# Patient Record
Sex: MALE | Race: WHITE | NOT HISPANIC OR LATINO | Employment: OTHER | URBAN - METROPOLITAN AREA
[De-identification: names, ages, dates, MRNs, and addresses within clinical notes are randomized per-mention and may not be internally consistent; named-entity substitution may affect disease eponyms.]

---

## 2018-12-20 DIAGNOSIS — Z79.899 DRUG THERAPY: Primary | ICD-10-CM

## 2018-12-20 DIAGNOSIS — F41.1 GENERALIZED ANXIETY DISORDER: ICD-10-CM

## 2018-12-20 LAB
AMPHETAMINES UR QL: NOT DETECTED NG/ML
BARBITURATES UR QL SCN: NOT DETECTED NG/ML
BENZODIAZ UR QL SCN: ABNORMAL NG/ML
BUPRENORPHINE UR QL: NOT DETECTED NG/ML
CANNABINOIDS UR QL: NOT DETECTED NG/ML
COCAINE UR QL SCN: NOT DETECTED NG/ML
D-METHAMPHET UR QL: NOT DETECTED NG/ML
ETHANOL UR QL SCN: NEGATIVE
METHADONE UR QL SCN: NOT DETECTED NG/ML
OPIATES UR QL SCN: NOT DETECTED NG/ML
OXYCODONE UR QL SCN: NOT DETECTED NG/ML
PCP UR QL SCN: NOT DETECTED NG/ML
PROPOXYPH UR QL: NOT DETECTED NG/ML
TRICYCLICS UR QL SCN: ABNORMAL NG/ML

## 2018-12-20 PROCEDURE — 80306 DRUG TEST PRSMV INSTRMNT: CPT

## 2018-12-20 PROCEDURE — 80320 DRUG SCREEN QUANTALCOHOLS: CPT

## 2019-07-26 ENCOUNTER — TRANSFERRED RECORDS (OUTPATIENT)
Dept: HEALTH INFORMATION MANAGEMENT | Facility: CLINIC | Age: 34
End: 2019-07-26

## 2019-07-26 ENCOUNTER — MEDICAL CORRESPONDENCE (OUTPATIENT)
Dept: HEALTH INFORMATION MANAGEMENT | Facility: CLINIC | Age: 34
End: 2019-07-26

## 2019-08-28 ENCOUNTER — PRE VISIT (OUTPATIENT)
Dept: ORTHOPEDICS | Facility: CLINIC | Age: 34
End: 2019-08-28

## 2019-08-28 DIAGNOSIS — M25.551 BILATERAL HIP PAIN: Primary | ICD-10-CM

## 2019-08-28 DIAGNOSIS — M25.552 BILATERAL HIP PAIN: Primary | ICD-10-CM

## 2019-08-28 NOTE — TELEPHONE ENCOUNTER
PREVISIT INFORMATION                                                    Jarret Means scheduled for future visit at [facility] specialty clinics.    Past surgery? Need images. Called [facility] and patient has not had them done.  Left message for patient to call me      Patient is scheduled to see Dr. Roy on 9/3  Reason for visit: bilateral hip pain  Referring provider Jeffy Hastings  Has patient seen previous specialist? No  Medical Records:  Available in chart.  Patient was previously seen at a [facility] [place] or [facility] facility.    REVIEW                                                      New patient packet mailed to patient: Yes  Medication reconciliation complete: No      Current Outpatient Medications   Medication Sig Dispense Refill     BUSPIRONE HCL PO        LORazepam (ATIVAN PO)          Allergies: Patient has no known allergies.        PLAN/FOLLOW-UP NEEDED                                                      Will route to care team for follow-up.    Patient Reminders Given:  Please, make sure you bring an updated list of your medications.   If you are having a procedure, please, present 15 minutes early.  If you need to cancel or reschedule,please call [phone].    Monika De Jesus RN

## 2019-09-03 ENCOUNTER — OFFICE VISIT (OUTPATIENT)
Dept: ORTHOPEDICS | Facility: CLINIC | Age: 34
End: 2019-09-03
Payer: COMMERCIAL

## 2019-09-03 ENCOUNTER — ANCILLARY PROCEDURE (OUTPATIENT)
Dept: GENERAL RADIOLOGY | Facility: CLINIC | Age: 34
End: 2019-09-03
Attending: ORTHOPAEDIC SURGERY
Payer: COMMERCIAL

## 2019-09-03 VITALS
HEART RATE: 112 BPM | WEIGHT: 169.6 LBS | HEIGHT: 71 IN | BODY MASS INDEX: 23.74 KG/M2 | DIASTOLIC BLOOD PRESSURE: 79 MMHG | OXYGEN SATURATION: 99 % | SYSTOLIC BLOOD PRESSURE: 115 MMHG

## 2019-09-03 DIAGNOSIS — M25.551 BILATERAL HIP PAIN: ICD-10-CM

## 2019-09-03 DIAGNOSIS — M25.551 RIGHT HIP PAIN: Primary | ICD-10-CM

## 2019-09-03 DIAGNOSIS — M25.552 BILATERAL HIP PAIN: ICD-10-CM

## 2019-09-03 PROCEDURE — 73523 X-RAY EXAM HIPS BI 5/> VIEWS: CPT | Performed by: RADIOLOGY

## 2019-09-03 PROCEDURE — 99204 OFFICE O/P NEW MOD 45 MIN: CPT | Performed by: ORTHOPAEDIC SURGERY

## 2019-09-03 RX ORDER — HYDROCODONE BITARTRATE AND ACETAMINOPHEN 5; 325 MG/1; MG/1
TABLET ORAL
COMMUNITY
Start: 2019-08-30

## 2019-09-03 RX ORDER — DULOXETIN HYDROCHLORIDE 60 MG/1
60 CAPSULE, DELAYED RELEASE ORAL DAILY
Refills: 2 | COMMUNITY
Start: 2019-08-25

## 2019-09-03 RX ORDER — CLONAZEPAM 1 MG/1
1 TABLET ORAL
COMMUNITY
Start: 2018-01-08

## 2019-09-03 ASSESSMENT — PAIN SCALES - GENERAL
PAINLEVEL: NO PAIN (1)
PAINLEVEL: NO PAIN (1)

## 2019-09-03 ASSESSMENT — MIFFLIN-ST. JEOR: SCORE: 1726.43

## 2019-09-03 NOTE — PATIENT INSTRUCTIONS
Thanks for coming today.  [facility]  [address] [place] [address]    To schedule future appointments in Ortho Clinic, you may call [phone].    To schedule ordered imaging by your provider:   Call Central Imaging [phone]    To schedule an injection ordered by your provider:  Call Central Imaging Injection scheduling line: [phone]  [employer] available online at:  [email or web address]    Please call if any further questions or concerns ([phone]).  Clinic hours 8 am to 5 pm.    Return to clinic (call) if symptoms worsen or fail to improve.

## 2019-09-03 NOTE — PROGRESS NOTES
"Chief Complaint: No chief complaint on file.  bilateral right > left hip pain     Physician:  eJffy Hastings    HPI: Jarret Means is a 34 year old male who presents today for evaluation of his right hip     Symptom Profile  Location of symptoms:  Groin though also with knee pain and numbness on the anterior thigh.   Onset: insidious  Trend: getting worse  Duration of symptoms: 8 years   Quality of symptoms: aching, sharp/stabbing  Severity: moderate   Alleviate: activity modification   Exacerbating: activities   Previous Treatments: Previous treatments include activity modification, oral pain medication, intra-articular steroid (none since the procedure), hip arthroscopy.     Hip arthroscopy in 2015. I don't have details of the procedure. There is no operative report to review and there is no pre-operative imaging available. Patient is not certain of the details of the procedure. Reports that he is wrose off.     Current Status:  Results of the patient s Hip Disability and Osteoarthritis Outcome Score (HOOS)  are as follows (0-100 scales with 100 being the theoretical best):  Pain: 3.5   Symptoms:25   ADLs:54   Sports/Recreation:43.75  Quality of Life:38   (http://koos.nu/)  [facility] Activity Score: 3    MEDICAL HISTORY:   Past Medical History:   Diagnosis Date     Anxiety 2/19/2013    In [place];  Anxiety;  On paxil as teenager.  Was on xanax (up to 4 mg), remeron at HS, and risperdone for sleep.  Went off xanax cold turkey and this was \"horrible\".  Recalls \"zombie\" feeling with trazodone.  Also has used ambien.      Hip impingement syndrome      Sleep disorder 2/22/2013       Medications:     Current Outpatient Medications:      BUSPIRONE HCL PO, , Disp: , Rfl:      LORazepam (ATIVAN PO), , Disp: , Rfl:     Allergies: Patient has no known allergies.    SURGICAL HISTORY: No past surgical history on file.  Three years s/p hip arthroscopy     FAMILY HISTORY:   Family History   Problem Relation Age of Onset " "    Neurologic Disorder Mother      Multiple Sclerosis Mother        SOCIAL HISTORY:   Social History     Tobacco Use     Smoking status: Former Smoker     Smokeless tobacco: Current User     Types: Chew   Substance Use Topics     Alcohol use: No   working, janet contractor     REVIEW OF SYSTEMS:  The comprehensive review of systems from the intake form was reviewed with the patient.  No fever, weight change or fatigue. No dry eyes. No oral ulcers, sore throat or voice change. No palpitations, syncope, angina or edema.  No chest pain, excessive sleepiness, shortness of breath or hemoptysis.   No abdominal pain, nausea, vomiting, diarrhea or heartburn.  No skin rash. No focal weakness or numbness. No bleeding or lymphadenopathy. No rhinitis or hives.     Exam:  On physical examination the patient appears the stated age, is in no acute distress, affectThe is appropriate, and breathing is non-labored.  Vitals are documented in the EMR and have been reviewed:    There were no vitals taken for this visit.  Data Unavailable  There is no height or weight on file to calculate BMI.  /79 (BP Location: Left arm, Patient Position: Sitting, Cuff Size: Adult Large)   Pulse 112   Ht 1.803 m (5' 11\")   Wt 76.9 kg (169 lb 9.6 oz)   SpO2 99%   BMI 23.65 kg/m      Rises from chair: easily   Gait: normal   Trendelenburg test:  Gains the exam table: easily     RIGHT hip subjective: a little irritated   Abd: 25  Add:  PFC:  Flexion: 90  IRF: 0  ERF:25  Impingement test: ++ groin     LEFT hip subjective: not irritated   Abd:  Add:  PFC:  Flexion: 95  IRF: 10  ERF: 25  Impingement test: +/-    Distally, the circulatory, motor, and sensation exam is intact with 5/5 EHL, gastroc-soleus, and tibialis anterior.  Sensation to light touch is intact.  Dorsalis pedis and posterior tibialis pulses are palpable.  There are no sores on the feet, no bruising, and no lymphedema.    X-rays:   LCE on the right is 18 degrees compared to 25 " on the left.   Alpha right 51 frog, 69 on the left     Assessment and Plan: This is a 34 year old with right hip pain 4 years after a hip arthroscopy. In the absence of knowing what all was done during that surgery it is a little difficult to offer a definitive opinion. Assuming that the left and right were symmetric this is a hip s/p a cam correction and rim trimming now with a sub 20 LCE and symptoms. 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 We discussed the non-operative management options and he is interested in having an intra-articular steroid on the right. In terms of the left we discussed that we are going to manage this symptomatically if this gets worse in terms symptoms and function I do think that there may be a roll for arthroscopy.    Addendum: I got a hold of the operative report and the above is in line with the procedure.

## 2019-09-03 NOTE — LETTER
"    9/3/2019         RE: Jarret Means  [address] [facility] [facility] [address]        Dear Colleague,    Thank you for referring your patient, Jarret Means, to the [facility]. Please see a copy of my visit note below.    Chief Complaint: No chief complaint on file.  bilateral right > left hip pain     Physician:  Jeffy Hastings    HPI: Jarret Means is a 34 year old male who presents today for evaluation of his right hip     Symptom Profile  Location of symptoms:  Groin though also with knee pain and numbness on the anterior thigh.   Onset: insidious  Trend: getting worse  Duration of symptoms: 8 years   Quality of symptoms: aching, sharp/stabbing  Severity: moderate   Alleviate: activity modification   Exacerbating: activities   Previous Treatments: Previous treatments include activity modification, oral pain medication, intra-articular steroid (none since the procedure), hip arthroscopy.     Hip arthroscopy in 2015. I don't have details of the procedure. There is no operative report to review and there is no pre-operative imaging available. Patient is not certain of the details of the procedure. Reports that he is wrose off.     Current Status:  Results of the patient s Hip Disability and Osteoarthritis Outcome Score (HOOS)  are as follows (0-100 scales with 100 being the theoretical best):  Pain: 3.5   Symptoms:25   ADLs:54   Sports/Recreation:43.75  Quality of Life:38   (http://koos.nu/)  [facility] Activity Score: 3    MEDICAL HISTORY:   Past Medical History:   Diagnosis Date     Anxiety 2/19/2013    In [place];  Anxiety;  On paxil as teenager.  Was on xanax (up to 4 mg), remeron at , and risperdone for sleep.  Went off xanax cold turkey and this was \"horrible\".  Recalls \"zombie\" feeling with trazodone.  Also has used ambien.      Hip impingement syndrome      Sleep disorder 2/22/2013       Medications:     Current Outpatient Medications:      BUSPIRONE HCL PO, , Disp: , Rfl:      " "LORazepam (ATIVAN PO), , Disp: , Rfl:     Allergies: Patient has no known allergies.    SURGICAL HISTORY: No past surgical history on file.  Three years s/p hip arthroscopy     FAMILY HISTORY:   Family History   Problem Relation Age of Onset     Neurologic Disorder Mother      Multiple Sclerosis Mother        SOCIAL HISTORY:   Social History     Tobacco Use     Smoking status: Former Smoker     Smokeless tobacco: Current User     Types: Chew   Substance Use Topics     Alcohol use: No   working, janet contractor     REVIEW OF SYSTEMS:  The comprehensive review of systems from the intake form was reviewed with the patient.  No fever, weight change or fatigue. No dry eyes. No oral ulcers, sore throat or voice change. No palpitations, syncope, angina or edema.  No chest pain, excessive sleepiness, shortness of breath or hemoptysis.   No abdominal pain, nausea, vomiting, diarrhea or heartburn.  No skin rash. No focal weakness or numbness. No bleeding or lymphadenopathy. No rhinitis or hives.     Exam:  On physical examination the patient appears the stated age, is in no acute distress, affectThe is appropriate, and breathing is non-labored.  Vitals are documented in the EMR and have been reviewed:    There were no vitals taken for this visit.  Data Unavailable  There is no height or weight on file to calculate BMI.  /79 (BP Location: Left arm, Patient Position: Sitting, Cuff Size: Adult Large)   Pulse 112   Ht 1.803 m (5' 11\")   Wt 76.9 kg (169 lb 9.6 oz)   SpO2 99%   BMI 23.65 kg/m       Rises from chair: easily   Gait: normal   Trendelenburg test:  Gains the exam table: easily     RIGHT hip subjective: a little irritated   Abd: 25  Add:  PFC:  Flexion: 90  IRF: 0  ERF:25  Impingement test: ++ groin     LEFT hip subjective: not irritated   Abd:  Add:  PFC:  Flexion: 95  IRF: 10  ERF: 25  Impingement test: +/-    Distally, the circulatory, motor, and sensation exam is intact with 5/5 EHL, gastroc-soleus, " and tibialis anterior.  Sensation to light touch is intact.  Dorsalis pedis and posterior tibialis pulses are palpable.  There are no sores on the feet, no bruising, and no lymphedema.    X-rays:   LCE on the right is 18 degrees compared to 25 on the left.   Alpha right 51 frog, 69 on the left     Assessment and Plan: This is a 34 year old with right hip pain 4 years after a hip arthroscopy. In the absence of knowing what all was done during that surgery it is a little difficult to offer a definitive opinion. Assuming that the left and right were symmetric this is a hip s/p a cam correction and rim trimming now with a sub 20 LCE and symptoms. 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 We discussed the non-operative management options and he is interested in having an intra-articular steroid on the right. In terms of the left we discussed that we are going to manage this symptomatically if this gets worse in terms symptoms and function I do think that there may be a roll for arthroscopy.    Addendum: I got a hold of the operative report and the above is in line with the procedure.         Again, thank you for allowing me to participate in the care of your patient.        Sincerely,        Shahriar Roy MD

## 2019-09-03 NOTE — NURSING NOTE
"Jarret Means's chief complaint for this visit includes:  Chief Complaint   Patient presents with     Left Hip - Pain     Right Hip - Pain     S/p arthroscopic surgery for impingement 9/2015     PCP: Arabella Beck    Referring Provider:  Jeffy Hastings PA-C  [facility]  [address] [place] [address] [address]  [place] [address]    /79 (BP Location: Left arm, Patient Position: Sitting, Cuff Size: Adult Large)   Pulse 112   Ht 1.803 m (5' 11\")   Wt 76.9 kg (169 lb 9.6 oz)   SpO2 99%   BMI 23.65 kg/m    No Pain (1)     Do you need any medication refills at today's visit? No    Zuleyka Son CMA        "

## 2022-11-10 ENCOUNTER — HOSPITAL ENCOUNTER (OUTPATIENT)
Dept: CT IMAGING | Facility: CLINIC | Age: 37
Discharge: HOME | End: 2022-11-10
Attending: INTERNAL MEDICINE | Admitting: INTERNAL MEDICINE
Payer: COMMERCIAL

## 2022-11-10 DIAGNOSIS — R06.02 SHORTNESS OF BREATH: ICD-10-CM

## 2022-11-10 DIAGNOSIS — R14.0 DISTENDED ABDOMEN: ICD-10-CM

## 2022-11-10 DIAGNOSIS — F10.11 HISTORY OF ALCOHOL ABUSE: ICD-10-CM

## 2022-11-10 PROCEDURE — 250N000011 HC RX IP 250 OP 636: Performed by: INTERNAL MEDICINE

## 2022-11-10 PROCEDURE — 74177 CT ABD & PELVIS W/CONTRAST: CPT

## 2022-11-10 RX ORDER — IOPAMIDOL 755 MG/ML
100 INJECTION, SOLUTION INTRAVASCULAR ONCE
Status: COMPLETED | OUTPATIENT
Start: 2022-11-10 | End: 2022-11-10

## 2022-11-10 RX ADMIN — IOPAMIDOL 90 ML: 755 INJECTION, SOLUTION INTRAVENOUS at 19:32

## 2022-11-29 ENCOUNTER — APPOINTMENT (OUTPATIENT)
Dept: MRI IMAGING | Facility: CLINIC | Age: 37
End: 2022-11-29
Attending: EMERGENCY MEDICINE
Payer: COMMERCIAL

## 2022-11-29 ENCOUNTER — HOSPITAL ENCOUNTER (EMERGENCY)
Facility: CLINIC | Age: 37
Discharge: HOME | End: 2022-11-30
Attending: EMERGENCY MEDICINE | Admitting: EMERGENCY MEDICINE
Payer: COMMERCIAL

## 2022-11-29 VITALS
WEIGHT: 210 LBS | BODY MASS INDEX: 29.4 KG/M2 | DIASTOLIC BLOOD PRESSURE: 96 MMHG | HEART RATE: 80 BPM | HEIGHT: 71 IN | RESPIRATION RATE: 18 BRPM | TEMPERATURE: 97.4 F | SYSTOLIC BLOOD PRESSURE: 143 MMHG | OXYGEN SATURATION: 100 %

## 2022-11-29 DIAGNOSIS — R07.9 CHEST PAIN, UNSPECIFIED TYPE: ICD-10-CM

## 2022-11-29 DIAGNOSIS — R42 DIZZINESS: ICD-10-CM

## 2022-11-29 DIAGNOSIS — R06.02 SHORTNESS OF BREATH: ICD-10-CM

## 2022-11-29 PROBLEM — R06.00 DYSPNEA: Status: ACTIVE | Noted: 2022-11-29

## 2022-11-29 PROBLEM — R53.83 FATIGUE: Status: ACTIVE | Noted: 2019-05-22

## 2022-11-29 PROBLEM — K42.9 UMBILICAL HERNIA WITHOUT OBSTRUCTION AND WITHOUT GANGRENE: Status: ACTIVE | Noted: 2018-01-10

## 2022-11-29 PROBLEM — R14.0 ABDOMINAL DISTENSION, GASEOUS: Status: ACTIVE | Noted: 2022-10-31

## 2022-11-29 PROBLEM — M25.551 HIP PAIN, RIGHT: Status: ACTIVE | Noted: 2019-05-22

## 2022-11-29 PROBLEM — F10.11 HISTORY OF ALCOHOL ABUSE: Status: ACTIVE | Noted: 2022-11-29

## 2022-11-29 PROBLEM — R35.0 INCREASED URINARY FREQUENCY: Status: ACTIVE | Noted: 2019-05-22

## 2022-11-29 PROBLEM — R29.898 WEAKNESS OF RIGHT LOWER EXTREMITY: Status: ACTIVE | Noted: 2019-05-22

## 2022-11-29 LAB
ANION GAP SERPL CALCULATED.3IONS-SCNC: 11 MMOL/L (ref 5–18)
BASOPHILS # BLD AUTO: 0.1 10E3/UL (ref 0–0.2)
BASOPHILS NFR BLD AUTO: 1 %
BUN SERPL-MCNC: 11 MG/DL (ref 8–22)
CALCIUM SERPL-MCNC: 9.3 MG/DL (ref 8.5–10.5)
CHLORIDE BLD-SCNC: 106 MMOL/L (ref 98–107)
CO2 SERPL-SCNC: 24 MMOL/L (ref 22–31)
CREAT SERPL-MCNC: 1.47 MG/DL (ref 0.7–1.3)
EOSINOPHIL # BLD AUTO: 0.1 10E3/UL (ref 0–0.7)
EOSINOPHIL NFR BLD AUTO: 1 %
ERYTHROCYTE [DISTWIDTH] IN BLOOD BY AUTOMATED COUNT: 12.6 % (ref 10–15)
FLUAV RNA SPEC QL NAA+PROBE: NEGATIVE
FLUBV RNA RESP QL NAA+PROBE: NEGATIVE
GFR SERPL CREATININE-BSD FRML MDRD: 62 ML/MIN/1.73M2
GLUCOSE BLD-MCNC: 87 MG/DL (ref 70–125)
HCT VFR BLD AUTO: 47.2 % (ref 40–53)
HGB BLD-MCNC: 15.8 G/DL (ref 13.3–17.7)
IMM GRANULOCYTES # BLD: 0 10E3/UL
IMM GRANULOCYTES NFR BLD: 0 %
LYMPHOCYTES # BLD AUTO: 2.6 10E3/UL (ref 0.8–5.3)
LYMPHOCYTES NFR BLD AUTO: 22 %
MAGNESIUM SERPL-MCNC: 1.7 MG/DL (ref 1.8–2.6)
MCH RBC QN AUTO: 29.1 PG (ref 26.5–33)
MCHC RBC AUTO-ENTMCNC: 33.5 G/DL (ref 31.5–36.5)
MCV RBC AUTO: 87 FL (ref 78–100)
MONOCYTES # BLD AUTO: 0.6 10E3/UL (ref 0–1.3)
MONOCYTES NFR BLD AUTO: 5 %
NEUTROPHILS # BLD AUTO: 8.4 10E3/UL (ref 1.6–8.3)
NEUTROPHILS NFR BLD AUTO: 71 %
NRBC # BLD AUTO: 0 10E3/UL
NRBC BLD AUTO-RTO: 0 /100
PLATELET # BLD AUTO: 324 10E3/UL (ref 150–450)
POTASSIUM BLD-SCNC: 3.7 MMOL/L (ref 3.5–5)
RBC # BLD AUTO: 5.43 10E6/UL (ref 4.4–5.9)
RSV RNA SPEC NAA+PROBE: NEGATIVE
SARS-COV-2 RNA RESP QL NAA+PROBE: NEGATIVE
SODIUM SERPL-SCNC: 141 MMOL/L (ref 136–145)
TROPONIN I SERPL-MCNC: 0.01 NG/ML (ref 0–0.29)
WBC # BLD AUTO: 11.8 10E3/UL (ref 4–11)

## 2022-11-29 PROCEDURE — 82310 ASSAY OF CALCIUM: CPT | Performed by: EMERGENCY MEDICINE

## 2022-11-29 PROCEDURE — 250N000013 HC RX MED GY IP 250 OP 250 PS 637: Performed by: EMERGENCY MEDICINE

## 2022-11-29 PROCEDURE — 83735 ASSAY OF MAGNESIUM: CPT | Performed by: EMERGENCY MEDICINE

## 2022-11-29 PROCEDURE — 70549 MR ANGIOGRAPH NECK W/O&W/DYE: CPT

## 2022-11-29 PROCEDURE — A9585 GADOBUTROL INJECTION: HCPCS | Performed by: EMERGENCY MEDICINE

## 2022-11-29 PROCEDURE — 36415 COLL VENOUS BLD VENIPUNCTURE: CPT | Performed by: EMERGENCY MEDICINE

## 2022-11-29 PROCEDURE — 96360 HYDRATION IV INFUSION INIT: CPT | Mod: 59

## 2022-11-29 PROCEDURE — 258N000003 HC RX IP 258 OP 636: Performed by: EMERGENCY MEDICINE

## 2022-11-29 PROCEDURE — 93005 ELECTROCARDIOGRAM TRACING: CPT | Performed by: EMERGENCY MEDICINE

## 2022-11-29 PROCEDURE — 70553 MRI BRAIN STEM W/O & W/DYE: CPT

## 2022-11-29 PROCEDURE — 87637 SARSCOV2&INF A&B&RSV AMP PRB: CPT | Performed by: EMERGENCY MEDICINE

## 2022-11-29 PROCEDURE — 84484 ASSAY OF TROPONIN QUANT: CPT | Performed by: EMERGENCY MEDICINE

## 2022-11-29 PROCEDURE — C9803 HOPD COVID-19 SPEC COLLECT: HCPCS

## 2022-11-29 PROCEDURE — 70544 MR ANGIOGRAPHY HEAD W/O DYE: CPT

## 2022-11-29 PROCEDURE — 96361 HYDRATE IV INFUSION ADD-ON: CPT

## 2022-11-29 PROCEDURE — 255N000002 HC RX 255 OP 636: Performed by: EMERGENCY MEDICINE

## 2022-11-29 PROCEDURE — 99285 EMERGENCY DEPT VISIT HI MDM: CPT | Mod: CS,25

## 2022-11-29 PROCEDURE — 85025 COMPLETE CBC W/AUTO DIFF WBC: CPT | Performed by: EMERGENCY MEDICINE

## 2022-11-29 RX ORDER — GADOBUTROL 604.72 MG/ML
10 INJECTION INTRAVENOUS ONCE
Status: COMPLETED | OUTPATIENT
Start: 2022-11-29 | End: 2022-11-29

## 2022-11-29 RX ORDER — MECLIZINE HYDROCHLORIDE 25 MG/1
25 TABLET ORAL ONCE
Status: COMPLETED | OUTPATIENT
Start: 2022-11-29 | End: 2022-11-29

## 2022-11-29 RX ADMIN — MECLIZINE HYDROCHLORIDE 25 MG: 25 TABLET ORAL at 19:12

## 2022-11-29 RX ADMIN — GADOBUTROL 10 ML: 604.72 INJECTION INTRAVENOUS at 23:13

## 2022-11-29 RX ADMIN — SODIUM CHLORIDE 1000 ML: 9 INJECTION, SOLUTION INTRAVENOUS at 19:10

## 2022-11-29 ASSESSMENT — ENCOUNTER SYMPTOMS
BLOOD IN STOOL: 0
ABDOMINAL PAIN: 0
DIZZINESS: 1
DIARRHEA: 0
LIGHT-HEADEDNESS: 1
NAUSEA: 1
FATIGUE: 1
SHORTNESS OF BREATH: 1

## 2022-11-29 ASSESSMENT — ACTIVITIES OF DAILY LIVING (ADL)
ADLS_ACUITY_SCORE: 35
ADLS_ACUITY_SCORE: 35

## 2022-11-29 NOTE — ED NOTES
"ED Triage Provider Note  [facility] EMERGENCY ROOM  Encounter Date: Nov 29, 2022    History:  Chief Complaint   Patient presents with     Dizziness     Shortness of Breath     Jarret Means is a 37 year old male who presents to the ED with feeling lightheaded. Also c/o paresthesias diffusely. Has hx of anxiety, on klonopin per chart. No current PCP.     Review of Systems:  No CP    Exam:  BP (!) 146/97   Pulse 101   Temp 97.4  F (36.3  C) (Temporal)   Resp 20   Ht 1.803 m (5' 11\")   Wt 95.3 kg (210 lb)   SpO2 100%   BMI 29.29 kg/m    General: No acute distress. Appears stated age.   Cardio: normal rate, extremities well perfused  Resp: Normal work of breathing, grossly normal respiratory rate  Neuro: Alert. Facial movement grossly symmetric. Grossly intact strength.       Medical Decision Making:  Patient arriving to the ED with problem as above. A medical screening exam was performed. Initial differential diagnosis includes but not limited to anemia, arrythmia, anxiety, electrolyte px, unlikely ACS. PERC neg    Cbc, bmp, trop, mg, ekg, covid orders initiated from Triage. The patient is most appropriate to return to the waiting room.       Cristiana Brunner MD  11/29/2022 at 4:26 PM     Cristiana Brunner MD  11/29/22 7389    "

## 2022-11-29 NOTE — ED TRIAGE NOTES
Pt presents with shortness of breath, dizziness, facial and hand tingling x 2 weeks. Has been seen and keeps being told it's anxiety.     Triage Assessment     Row Name 11/29/22 1616       Triage Assessment (Adult)    Airway WDL WDL       Respiratory WDL    Respiratory WDL X;rhythm/pattern    Rhythm/Pattern, Respiratory shortness of breath       Skin Circulation/Temperature WDL    Skin Circulation/Temperature WDL WDL       Cardiac WDL    Cardiac WDL WDL       Peripheral/Neurovascular WDL    Peripheral Neurovascular WDL WDL       Cognitive/Neuro/Behavioral WDL    Cognitive/Neuro/Behavioral WDL WDL

## 2022-11-30 ASSESSMENT — ENCOUNTER SYMPTOMS
HEADACHES: 1
WEAKNESS: 0

## 2022-11-30 NOTE — ED PROVIDER NOTES
Emergency Department Midlevel Supervisory Note     I personally saw the patient and performed a substantive portion of the visit including all aspects of the medical decision making.    ED Course:  6:59 PM  Faina Raza PA-C staffed patient with me. I agree with their assessment and plan of management, and I will see the patient.    Brief HPI:     Jarret Means is a 37 year old male who presents for evaluation of dizziness and shortness of breath.    I, Juan Bentley, am serving as a scribe to document services personally performed by Kenneth Moon, based on my observations and the provider's statements to me.   I, Kenneth Moon, attest that Juan Bentley was acting in a scribe capacity, has observed my performance of the services and has documented them in accordance with my direction.    Brief Physical Exam:  Constitutional:  Alert, in no acute distress  EYES: Conjunctivae clear  HENT:  Atraumatic, normocephalic  Respiratory:  Respirations even, unlabored, in no acute respiratory distress  Cardiovascular:  Regular rate and rhythm, good peripheral perfusion  GI: Soft, nondistended, nontender, no palpable masses, no rebound, no guarding   Musculoskeletal:  No edema. No cyanosis. Range of motion major extremities intact.    Integument: Warm, Dry, No erythema, No rash.   Neurologic:  Alert & oriented, no focal deficits noted  Psych: Normal mood and affect     MDM:    ED Course as of 11/29/22 2115 Tue Nov 29, 2022 1904 I met with the patient for an interview and initial exam. Plans for change were discussed.   1907 Patient is a 37-year-old gentleman with history of anxiety, episodic dizziness that feels like he is leaning forward, worse today.  Not improving with normal home measures.  He is mildly anxious here, no focal neurologic deficit.  Plan to get MRI, MRA of head and neck to rule out dissection, stroke, although unlikely, it has not been done in the past and will be important to rule out any  intracranial abnormality before continuing to treat as anxiety.    Awaiting MRI results.  If they are negative then plan to have patient follow-up with primary care doctor for anxiety, continued work-up for lightheadedness.       1. Dizziness    2. Chest pain, unspecified type    3. Shortness of breath        Labs and Imaging:  Results for orders placed or performed during the hospital encounter of 11/29/22   Basic metabolic panel   Result Value Ref Range    Sodium 141 136 - 145 mmol/L    Potassium 3.7 3.5 - 5.0 mmol/L    Chloride 106 98 - 107 mmol/L    Carbon Dioxide (CO2) 24 22 - 31 mmol/L    Anion Gap 11 5 - 18 mmol/L    Urea Nitrogen 11 8 - 22 mg/dL    Creatinine 1.47 (H) 0.70 - 1.30 mg/dL    Calcium 9.3 8.5 - 10.5 mg/dL    Glucose 87 70 - 125 mg/dL    GFR Estimate 62 >60 mL/min/1.73m2   Result Value Ref Range    Magnesium 1.7 (L) 1.8 - 2.6 mg/dL   Result Value Ref Range    Troponin I 0.01 0.00 - 0.29 ng/mL   Symptomatic; Unknown Influenza A/B & SARS-CoV2 (COVID-19) Virus PCR Multiplex Nasopharyngeal    Specimen: Nasopharyngeal; Swab   Result Value Ref Range    Influenza A PCR Negative Negative    Influenza B PCR Negative Negative    RSV PCR Negative Negative    SARS CoV2 PCR Negative Negative   CBC with platelets and differential   Result Value Ref Range    WBC Count 11.8 (H) 4.0 - 11.0 10e3/uL    RBC Count 5.43 4.40 - 5.90 10e6/uL    Hemoglobin 15.8 13.3 - 17.7 g/dL    Hematocrit 47.2 40.0 - 53.0 %    MCV 87 78 - 100 fL    MCH 29.1 26.5 - 33.0 pg    MCHC 33.5 31.5 - 36.5 g/dL    RDW 12.6 10.0 - 15.0 %    Platelet Count 324 150 - 450 10e3/uL    % Neutrophils 71 %    % Lymphocytes 22 %    % Monocytes 5 %    % Eosinophils 1 %    % Basophils 1 %    % Immature Granulocytes 0 %    NRBCs per 100 WBC 0 <1 /100    Absolute Neutrophils 8.4 (H) 1.6 - 8.3 10e3/uL    Absolute Lymphocytes 2.6 0.8 - 5.3 10e3/uL    Absolute Monocytes 0.6 0.0 - 1.3 10e3/uL    Absolute Eosinophils 0.1 0.0 - 0.7 10e3/uL    Absolute Basophils 0.1  0.0 - 0.2 10e3/uL    Absolute Immature Granulocytes 0.0 <=0.4 10e3/uL    Absolute NRBCs 0.0 10e3/uL     I have reviewed the relevant laboratory and radiology studies    Procedures:  I was present for the key portions of this procedure: none    Kenneth Moon MD  [facility] EMERGENCY ROOM  [address] [facility] [address]  [phone]       Kenneth Moon MD  11/29/22 2119       Kenneth Moon MD  11/29/22 1549

## 2022-11-30 NOTE — DISCHARGE INSTRUCTIONS
You were seen and evaluated here in the emergency department for a multitude of symptoms that have been going on for the past several months including dizziness, chest pain, shortness of breath, word finding difficulties.  Laboratory evaluation, EKG and imaging of the brain are all reassuring and with symptoms going on for several months I do not feel further emergent work-up is indicated here in the ED.    Your vitals are reassuring and with negative work-up here I recommend follow-up with primary care.  I did place a referral and they should be calling you within the next few days to schedule an appointment.      Recommend keeping hydrated, getting plenty of rest and taking all your medications as prescribed.    Return to the emergency department for worsening chest pain, difficulty breathing, syncope or any other concerning symptoms.

## 2022-11-30 NOTE — ED PROVIDER NOTES
EMERGENCY DEPARTMENT ENCOUNTER      NAME: Jarret Means  AGE: 37 year old male  YOB: 1985  MRN: [id number]  EVALUATION DATE & TIME: No admission date for patient encounter.    PCP: Arabella Beck    ED PROVIDER: Faina Raza PA-C      Chief Complaint   Patient presents with     Dizziness     Shortness of Breath         FINAL IMPRESSION:  1. Dizziness    2. Chest pain, unspecified type    3. Shortness of breath        MEDICAL DECISION MAKING:    Pertinent Labs & Imaging studies reviewed. (See chart for details)  37 year old male presents to the Emergency Department for evaluation of a multitude of complaints including chest pain, shortness of breath, dizziness/lightheadedness.  For the past several months to a year the patient has had intermittent chest pain, shortness of breath, dizziness, lightheadedness.  His main complaint today is that he has felt dizzy and lightheaded for the past 2 weeks and today while at work his symptoms became acutely worse and he was concerned so he presented to the emergency department.  On my evaluation, patient was slightly hypertensive at 146/97 but otherwise vitally stable.  Examination with heart in regular rate and rhythm and lungs clear to auscultation bilaterally.  He had 5 out of 5 strength and sensation in bilateral upper and lower extremities.  He was alert and oriented x3, cranial nerves II through XII are grossly intact and pupils were equal round and reactive to light and extraocular movements were intact.  Differential diagnosis included, but is not limited to, ACS, PE, electrolyte derangements, anxiety, hemorrhage, stroke, vertigo.    Work-up was initiated in triage prior to me evaluating the patient and included COVID/influenza/RSV, CBC, BMP, magnesium, troponin, EKG.  COVID, influenza, RSV testing were negative.  CBC with slightly elevated white blood cell count of 11.8 without any other significant derangements.  He is not having any other  infectious symptoms, is afebrile here and it is unclear what is causing the slight elevation in his white blood cell count.  BMP with slightly elevated creatinine of 1.47 which is elevated from prior at 1.11.  He has been eating and drinking okay and it is unclear the cause of this elevation however, I would like him to follow-up with his primary care provider for this.  Magnesium was slightly low at 1.7 is not having significant symptoms or EKG changes because of this.  Troponin was negative at 0.01 and EKG normal sinus rhythm without any ST or T wave changes concerning for ACS.  He is not having any current chest pain and they do not feel that this is the cause of his symptoms.  He is PERC negative and his shortness of breath comes and goes especially at night and I do not feel D-dimer or CT PE is indicated as he has normal oxygen on room air.  Did not feel imaging of the chest was indicated as he is having the symptoms on and off for the past several months to year and normal vital signs here.  When talking with me, the patient's main concern was his dizziness/lightheadedness.  I discussed attempting symptom control with meclizine and MRI/MRA of the head and neck to rule out stroke, dissection, mass or other cause of his symptoms and patient was in agreement and understanding.  Unfortunately, meclizine did not help significantly with his symptoms.  After waiting several hours, nursing staff informed me that the patient no longer wanted to wait and as he was having symptoms for several months he said that he can just follow-up as an outpatient.  However, 15 to 20 minutes later as I was getting his discharge ready, nursing staff came up to me again and stated that he was wanting to stay for the MRI.  MRI/MRA of the head and neck was performed and was negative for any mass, lesion, hemorrhage, focal ischemia, acute vessel occlusion, significant stenosis or aneurysm in the head or neck.  After MRI had resulted and I  had got his discharge ready I went to update the patient and get him discharged to follow-up with his primary care provider however, I found that he had left the department and taken out his IV.  Nursing staff told me that his ride was coming at midnight, but he said that he would wait until he spoke with me.  I went to evaluate him at 12:10 AM and he was already gone.  I was not able to discuss results or follow up plan of care. I did call him and I was able to update him on results, plan of care and all questions were answered to the best my ability.    Medical Decision Making    History:    Supplemental history from: N/A    External Record(s) reviewed: Documented in HPI, if applicable.    Work Up:    Chart documentation includes differential considered and any EKGs or imaging interpreted by provider.    In additional to work up documented, I considered the following work up: See chart documentation, if applicable.    External consultation:    Discussion of management with another provider: See chart documentation, if applicable    Complicating factors:    Care impacted by chronic illness: Mental Health and Smoking / Nicotine Use    Care affected by social determinants of health: Alcohol Abuse and/or Recreational Drug Use    Disposition considerations: Discharge. I recommended the patient continue their current prescription strength medication(s). I did not consider admission.      ED COURSE:  6:29 PM I met with the patient, obtained history, performed an initial exam, and discussed options and plan for diagnostics and treatment here in the ED.    6:45 PM I staffed the patient with Dr. Moon.    7:45 PM I evaluated the patient and she had no significant improvement of his symptoms with the medications.    9:00 PM nursing staff informing that the patient did not want to wait for MRI however, shortly after this he changed his mind and was wanting to wait for the MRI tonight.    12:10 AM I went to update the  "patient on his MRI results and he was no longer in the surge area in the waiting room.  His IV was in the garbage and nursing staff informed me that his ride was supposed to be coming at midnight, but he said that he would have them wait until I was able to speak with him.  Unfortunately, I was not able to update him on his results, plan of care and return precautions as he left the emergency department.    MEDICATIONS GIVEN IN THE EMERGENCY:  Medications   0.9% sodium chloride BOLUS (0 mLs Intravenous Stopped 11/29/22 2105)   meclizine (ANTIVERT) tablet 25 mg (25 mg Oral Given 11/29/22 1912)   gadobutrol (GADAVIST) injection 10 mL (10 mLs Intravenous Given 11/29/22 2313)       NEW PRESCRIPTIONS STARTED AT TODAY'S ER VISIT  New Prescriptions    No medications on file            =================================================================    HPI:    Patient information was obtained from: Patient     Use of Interpretor: N/A       Jarretemigdio Means is a 37 year old male with a pertinent history of anxiety and alcohol abuse who presents to this ED via walk-in for evaluation of dizziness and shortness of breath    The patient reports they have felt dizzy and faint for about 2 weeks. They state that they feel off balance \"all of the time\" and \"feel like he is in a different position than he is in\". They also complain of sinus and head pressure. The patient also states they \"can't catch his breath\" late at night and have been \"forgetting words all the time\". They feel as though their hands and feet have been extra cold lately. The patient also complains of nausea and fatigue, stating, \"my energy is not there\".  The patient feels that he has been told this is his anxiety, but he has been taking his Klonopin and it has not helped his symptoms. The patient denies black or bloody stools, abdominal pain, diarrhea, focal weakness, syncope, urinary symptoms, leg swelling, visual disturbances, or any other symptoms or " "complaints.     Social history: The patient denies alcohol or drug use, but reports smoking a 1/2 pack a day. The patient reports they do not have a primary care physician     REVIEW OF SYSTEMS:  Review of Systems   Constitutional: Positive for fatigue.   Eyes: Negative for visual disturbance.   Respiratory: Positive for shortness of breath.    Cardiovascular: Positive for chest pain. Negative for leg swelling.   Gastrointestinal: Positive for nausea. Negative for abdominal pain, blood in stool and diarrhea.   Genitourinary: Negative.    Neurological: Positive for dizziness, light-headedness and headaches. Negative for syncope and weakness.   All other systems reviewed and are negative.       PAST MEDICAL HISTORY:  Past Medical History:   Diagnosis Date     Anxiety 2/19/2013    In [place];  Anxiety;  On paxil as teenager.  Was on xanax (up to 4 mg), remeron at HS, and risperdone for sleep.  Went off xanax cold turkey and this was \"horrible\".  Recalls \"zombie\" feeling with trazodone.  Also has used ambien.      Hip impingement syndrome      Sleep disorder 2/22/2013       PAST SURGICAL HISTORY:  History reviewed. No pertinent surgical history.        CURRENT MEDICATIONS:    No current facility-administered medications for this encounter.    Current Outpatient Medications:      BUSPIRONE HCL PO, , Disp: , Rfl:      clonazePAM (KLONOPIN) 1 MG tablet, Take 1 mg by mouth, Disp: , Rfl:      DULoxetine (CYMBALTA) 60 MG capsule, 60 mg daily, Disp: , Rfl: 2     HYDROcodone-acetaminophen (NORCO) 5-325 MG tablet, , Disp: , Rfl:      LORazepam (ATIVAN PO), , Disp: , Rfl:       ALLERGIES:  No Known Allergies    FAMILY HISTORY:  Family History   Problem Relation Age of Onset     Neurologic Disorder Mother      Multiple Sclerosis Mother        SOCIAL HISTORY:   Social History     Socioeconomic History     Marital status: Single   Tobacco Use     Smoking status: Former     Smokeless tobacco: Current     Types: Chew   Substance " "and Sexual Activity     Alcohol use: No     Drug use: No     Sexual activity: Yes     Partners: Female     Birth control/protection: I.U.D.   Other Topics Concern     Parent/sibling w/ CABG, MI or angioplasty before 65F 55M? No       VITALS:  Patient Vitals for the past 24 hrs:   BP Temp Temp src Pulse Resp SpO2 Height Weight   11/29/22 2107 (!) 143/96 -- -- 80 -- 100 % -- --   11/29/22 1928 (!) 136/96 -- -- 80 18 100 % -- --   11/29/22 1628 (!) 146/97 97.4  F (36.3  C) Temporal 101 20 100 % 1.803 m (5' 11\") 95.3 kg (210 lb)       PHYSICAL EXAM    Constitutional: Well developed, Well nourished, NAD  HENT: Normocephalic, Atraumatic, Bilateral external ears normal, Oropharynx normal, mucous membranes moist, Nose normal.   Neck: Normal range of motion, No tenderness, Supple, No stridor.  Eyes: PERRL, EOMI, Conjunctiva normal, No discharge.   Respiratory: Normal breath sounds, No respiratory distress, No wheezing, Speaks full sentences easily. No cough.  Cardiovascular: Normal heart rate, Regular rhythm, No murmurs, No rubs, No gallops. Chest wall nontender.  GI: Soft, No tenderness, No masses, No flank tenderness. No rebound or guarding.  Musculoskeletal: 2+ DP pulses. No edema. No cyanosis, No clubbing. Good range of motion in all major joints. No tenderness to palpation or major deformities noted. No tenderness of the CTLS spine.   Integument: Warm, Dry, No erythema, No rash. No petechiae.  Neurologic: 5 out of 5 strength and sensation in bilateral upper and lower extremities.  Cranial nerves II through XII grossly intact.  Alert & oriented x 3, Normal motor function, Normal sensory function, No focal deficits noted. Normal gait.  Psychiatric: Affect normal, Judgment normal, Mood normal. Cooperative.    LAB:  All pertinent labs reviewed and interpreted.  Recent Results (from the past 24 hour(s))   Symptomatic; Unknown Influenza A/B & SARS-CoV2 (COVID-19) Virus PCR Multiplex Nasopharyngeal    Collection Time: 11/29/22 "  4:31 PM    Specimen: Nasopharyngeal; Swab   Result Value Ref Range    Influenza A PCR Negative Negative    Influenza B PCR Negative Negative    RSV PCR Negative Negative    SARS CoV2 PCR Negative Negative   Basic metabolic panel    Collection Time: 11/29/22  4:37 PM   Result Value Ref Range    Sodium 141 136 - 145 mmol/L    Potassium 3.7 3.5 - 5.0 mmol/L    Chloride 106 98 - 107 mmol/L    Carbon Dioxide (CO2) 24 22 - 31 mmol/L    Anion Gap 11 5 - 18 mmol/L    Urea Nitrogen 11 8 - 22 mg/dL    Creatinine 1.47 (H) 0.70 - 1.30 mg/dL    Calcium 9.3 8.5 - 10.5 mg/dL    Glucose 87 70 - 125 mg/dL    GFR Estimate 62 >60 mL/min/1.73m2   Magnesium    Collection Time: 11/29/22  4:37 PM   Result Value Ref Range    Magnesium 1.7 (L) 1.8 - 2.6 mg/dL   Troponin I    Collection Time: 11/29/22  4:37 PM   Result Value Ref Range    Troponin I 0.01 0.00 - 0.29 ng/mL   CBC with platelets and differential    Collection Time: 11/29/22  4:37 PM   Result Value Ref Range    WBC Count 11.8 (H) 4.0 - 11.0 10e3/uL    RBC Count 5.43 4.40 - 5.90 10e6/uL    Hemoglobin 15.8 13.3 - 17.7 g/dL    Hematocrit 47.2 40.0 - 53.0 %    MCV 87 78 - 100 fL    MCH 29.1 26.5 - 33.0 pg    MCHC 33.5 31.5 - 36.5 g/dL    RDW 12.6 10.0 - 15.0 %    Platelet Count 324 150 - 450 10e3/uL    % Neutrophils 71 %    % Lymphocytes 22 %    % Monocytes 5 %    % Eosinophils 1 %    % Basophils 1 %    % Immature Granulocytes 0 %    NRBCs per 100 WBC 0 <1 /100    Absolute Neutrophils 8.4 (H) 1.6 - 8.3 10e3/uL    Absolute Lymphocytes 2.6 0.8 - 5.3 10e3/uL    Absolute Monocytes 0.6 0.0 - 1.3 10e3/uL    Absolute Eosinophils 0.1 0.0 - 0.7 10e3/uL    Absolute Basophils 0.1 0.0 - 0.2 10e3/uL    Absolute Immature Granulocytes 0.0 <=0.4 10e3/uL    Absolute NRBCs 0.0 10e3/uL         RADIOLOGY:  Reviewed all pertinent imaging. Please see official radiology report.  MRA Neck (Carotids) wo & w Contrast   Final Result   IMPRESSION:   HEAD MRI:    1.  No discrete mass lesion, hemorrhage or focal  area suggestive of acute ischemia.   2.  No abnormal signal or abnormal enhancement.      HEAD MRA:    1.  No discrete vessel occlusion, significant stenosis, aneurysm or high flow vascular malformation involving the arteries of the [place].      NECK MRA:   1.  Bovine arch configuration of great vessels off the aortic arch with no significant stenosis of their origins.   2.  No significant stenosis or irregularity involving the arteries of the neck.   3.  No radiographic evidence of dissection.      MR Brain w/o & w Contrast   Final Result   IMPRESSION:   HEAD MRI:    1.  No discrete mass lesion, hemorrhage or focal area suggestive of acute ischemia.   2.  No abnormal signal or abnormal enhancement.      HEAD MRA:    1.  No discrete vessel occlusion, significant stenosis, aneurysm or high flow vascular malformation involving the arteries of the [place].      NECK MRA:   1.  Bovine arch configuration of great vessels off the aortic arch with no significant stenosis of their origins.   2.  No significant stenosis or irregularity involving the arteries of the neck.   3.  No radiographic evidence of dissection.      MRA Brain ([place]) wo Contrast   Final Result   IMPRESSION:   HEAD MRI:    1.  No discrete mass lesion, hemorrhage or focal area suggestive of acute ischemia.   2.  No abnormal signal or abnormal enhancement.      HEAD MRA:    1.  No discrete vessel occlusion, significant stenosis, aneurysm or high flow vascular malformation involving the arteries of the [place].      NECK MRA:   1.  Bovine arch configuration of great vessels off the aortic arch with no significant stenosis of their origins.   2.  No significant stenosis or irregularity involving the arteries of the neck.   3.  No radiographic evidence of dissection.          Kelli KNOX, am serving as a scribe to document services personally performed by Faina Raza PA-C based on my observation and the  provider's statements to me. I, Faina Raza PA-C attest that Kelli Solomon is acting in a scribe capacity, has observed my performance of the services and has documented them in accordance with my direction.    Faina Raza PA-C  Emergency Medicine  [facility]  11/29/2022     Faina Raza PA-C  11/30/22 0050

## 2023-01-12 LAB
ATRIAL RATE - MUSE: 99 BPM
DIASTOLIC BLOOD PRESSURE - MUSE: NORMAL MMHG
INTERPRETATION ECG - MUSE: NORMAL
P AXIS - MUSE: 51 DEGREES
PR INTERVAL - MUSE: 156 MS
QRS DURATION - MUSE: 98 MS
QT - MUSE: 354 MS
QTC - MUSE: 454 MS
R AXIS - MUSE: 66 DEGREES
SYSTOLIC BLOOD PRESSURE - MUSE: NORMAL MMHG
T AXIS - MUSE: 12 DEGREES
VENTRICULAR RATE- MUSE: 99 BPM

## 2023-02-08 ENCOUNTER — OFFICE VISIT (OUTPATIENT)
Dept: AUDIOLOGY | Facility: CLINIC | Age: 38
End: 2023-02-08
Payer: COMMERCIAL

## 2023-02-08 DIAGNOSIS — H90.5 SENSORINEURAL HEARING LOSS (SNHL), UNSPECIFIED LATERALITY: ICD-10-CM

## 2023-02-08 DIAGNOSIS — H93.13 TINNITUS OF BOTH EARS: ICD-10-CM

## 2023-02-08 DIAGNOSIS — R42 DIZZINESS AND GIDDINESS: Primary | ICD-10-CM

## 2023-02-08 DIAGNOSIS — H90.5 SENSORINEURAL HEARING LOSS (SNHL), UNSPECIFIED LATERALITY: Primary | ICD-10-CM

## 2023-02-08 PROCEDURE — 92550 TYMPANOMETRY & REFLEX THRESH: CPT | Performed by: AUDIOLOGIST

## 2023-02-08 PROCEDURE — 92557 COMPREHENSIVE HEARING TEST: CPT | Performed by: AUDIOLOGIST

## 2023-02-08 NOTE — PROGRESS NOTES
AUDIOLOGY REPORT    SUMMARY: Audiology visit completed. See audiogram for results. Abuse screening not completed due to same day appt with ENT clinic, where this is addressed.      RECOMMENDATIONS: Follow-up with ENT.      Rory Jonas, [facility]  [place] Licensed Audiologist [id number]

## 2023-02-10 ENCOUNTER — TELEPHONE (OUTPATIENT)
Dept: AUDIOLOGY | Facility: CLINIC | Age: 38
End: 2023-02-10

## 2023-02-10 ENCOUNTER — OFFICE VISIT (OUTPATIENT)
Dept: OTOLARYNGOLOGY | Facility: CLINIC | Age: 38
End: 2023-02-10
Payer: COMMERCIAL

## 2023-02-10 DIAGNOSIS — R07.89 CHEST TIGHTNESS: ICD-10-CM

## 2023-02-10 DIAGNOSIS — R42 DIZZINESS: Primary | ICD-10-CM

## 2023-02-10 DIAGNOSIS — R06.02 SOB (SHORTNESS OF BREATH): ICD-10-CM

## 2023-02-10 PROCEDURE — 99203 OFFICE O/P NEW LOW 30 MIN: CPT | Performed by: OTOLARYNGOLOGY

## 2023-02-10 NOTE — LETTER
2/10/2023         RE: Jarret Means  [address] [place] [address]        Dear Colleague,    Thank you for referring your patient, Jarret Means, to the [facility]. Please see a copy of my visit note below.    HPI: This patient is a 38yo M with anxiety issues who presents for evaluation of dizziness. He states that for over 10 years, he feels imbalanced and has chest pains. These symptoms are worsening with time and becoming debilitating. This is worse when his anxiety is high. He denies any true vertigo or oscillopsia. He has taken meclizine in the past with no improvement. There is bilateral, non-pulsatile tinnitus, most noticeable when it is quiet. Denies otalgia, otorrhea, vertigo, and other major medical issues. Just had an MRI for this, which was normal. He has not seen, nor been referred to Neurology. On a few psych meds.     Past medical history, surgical history, social history, family history, medications, and allergies have been reviewed with the patient and are documented above.    Review of Systems: a 10-system review was performed. Pertinent positives are noted in the HPI and on a separate scanned document in the chart.    PHYSICAL EXAMINATION:  GEN: no acute distress, normocephalic  EYES: extraocular movements are intact, pupils are equal and round. Sclera clear.   EARS: auricles are normally formed. The external auditory canals are clear with minimal to no cerumen. Tympanic membranes are intact bilaterally with no signs of infection, effusion, retractions, or perforations.  NOSE: anterior nares are patent.   OC/OP: clear, dentition is in good repair. The tongue and palate are fully mobile and symmetric. No masses or lesions.  NECK: soft and supple. No lymphadenopathy or masses. Airway is midline.   NEURO: CN VII and XII symmetric. alert and oriented. No spontaneous nystagmus. Gait is normal.  PULM: breathing comfortably on room air, normal chest  expansion with respiration  CARDS: no cyanosis or clubbing, normal carotid pulses    AUDIOGRAM: normal hearing, type a tymps, 100%  WRS.     MRI/MRA BRAIN 11/22 (done for dizziness):  IMPRESSION:  HEAD MRI:   1.  No discrete mass lesion, hemorrhage or focal area suggestive of acute ischemia.  2.  No abnormal signal or abnormal enhancement.     HEAD MRA:   1.  No discrete vessel occlusion, significant stenosis, aneurysm or high flow vascular malformation involving the arteries of the [place].     NECK MRA:  1.  Bovine arch configuration of great vessels off the aortic arch with no significant stenosis of their origins.  2.  No significant stenosis or irregularity involving the arteries of the neck.  3.  No radiographic evidence of dissection.    MEDICAL DECISION-MAKING: This patient is a 38yo M with non-specific dizziness that worsens with anxiety. He is not describing any qualities of this dizziness that would raise suspicion for an otologic source, and his audiogram is normal. That being said, his symptoms are becoming debilitating and there isn't a clear answer as to what the problem is. Will send him for a VNG to further try to gain clarity on the situation/pathology. As for the shortness of breath, it is tightness in his chest more than anywhere else. Will refer to Pulmonary for further investigation into this. Will call him with the VNG results and any plan as dictated by those results.           Again, thank you for allowing me to participate in the care of your patient.        Sincerely,        Autumn Parker MD

## 2023-02-10 NOTE — PROGRESS NOTES
HPI: This patient is a 36yo M with anxiety issues who presents for evaluation of dizziness. He states that for over 10 years, he feels imbalanced and has chest pains. These symptoms are worsening with time and becoming debilitating. This is worse when his anxiety is high. He denies any true vertigo or oscillopsia. He has taken meclizine in the past with no improvement. There is bilateral, non-pulsatile tinnitus, most noticeable when it is quiet. Denies otalgia, otorrhea, vertigo, and other major medical issues. Just had an MRI for this, which was normal. He has not seen, nor been referred to Neurology. On a few psych meds.     Past medical history, surgical history, social history, family history, medications, and allergies have been reviewed with the patient and are documented above.    Review of Systems: a 10-system review was performed. Pertinent positives are noted in the HPI and on a separate scanned document in the chart.    PHYSICAL EXAMINATION:  GEN: no acute distress, normocephalic  EYES: extraocular movements are intact, pupils are equal and round. Sclera clear.   EARS: auricles are normally formed. The external auditory canals are clear with minimal to no cerumen. Tympanic membranes are intact bilaterally with no signs of infection, effusion, retractions, or perforations.  NOSE: anterior nares are patent.   OC/OP: clear, dentition is in good repair. The tongue and palate are fully mobile and symmetric. No masses or lesions.  NECK: soft and supple. No lymphadenopathy or masses. Airway is midline.   NEURO: CN VII and XII symmetric. alert and oriented. No spontaneous nystagmus. Gait is normal.  PULM: breathing comfortably on room air, normal chest expansion with respiration  CARDS: no cyanosis or clubbing, normal carotid pulses    AUDIOGRAM: normal hearing, type a tymps, 100%  WRS.     MRI/MRA BRAIN 11/22 (done for dizziness):  IMPRESSION:  HEAD MRI:   1.  No discrete mass lesion, hemorrhage or focal area  suggestive of acute ischemia.  2.  No abnormal signal or abnormal enhancement.     HEAD MRA:   1.  No discrete vessel occlusion, significant stenosis, aneurysm or high flow vascular malformation involving the arteries of the [place].     NECK MRA:  1.  Bovine arch configuration of great vessels off the aortic arch with no significant stenosis of their origins.  2.  No significant stenosis or irregularity involving the arteries of the neck.  3.  No radiographic evidence of dissection.    MEDICAL DECISION-MAKING: This patient is a 38yo M with non-specific dizziness that worsens with anxiety. He is not describing any qualities of this dizziness that would raise suspicion for an otologic source, and his audiogram is normal. That being said, his symptoms are becoming debilitating and there isn't a clear answer as to what the problem is. Will send him for a VNG to further try to gain clarity on the situation/pathology. As for the shortness of breath, it is tightness in his chest more than anywhere else. Will refer to Pulmonary for further investigation into this. Will call him with the VNG results and any plan as dictated by those results.

## 2023-02-10 NOTE — TELEPHONE ENCOUNTER
[facility] Call Center    Phone Message    May a detailed message be left on voicemail: no     Reason for Call: Appointment Intake    Referring Provider Name: Autumn Parker MD  Diagnosis and/or Symptoms: Balance Referral: Videonystagmography (VNG) with [facility]    Sending to clinic per protocols    Action Taken: Other: Audiology    Travel Screening: Not Applicable

## 2023-02-27 DIAGNOSIS — R06.02 SOB (SHORTNESS OF BREATH): Primary | ICD-10-CM

## 2023-04-25 ENCOUNTER — ANCILLARY ORDERS (OUTPATIENT)
Dept: RADIOLOGY | Facility: CLINIC | Age: 38
End: 2023-04-25